# Patient Record
Sex: FEMALE | URBAN - METROPOLITAN AREA
[De-identification: names, ages, dates, MRNs, and addresses within clinical notes are randomized per-mention and may not be internally consistent; named-entity substitution may affect disease eponyms.]

---

## 2022-09-18 ENCOUNTER — CLAIMS CREATED FROM THE CLAIM WINDOW (OUTPATIENT)
Dept: URBAN - METROPOLITAN AREA MEDICAL CENTER 43 | Facility: MEDICAL CENTER | Age: 32
End: 2022-09-18
Payer: COMMERCIAL

## 2022-09-18 DIAGNOSIS — R74.01 ABNORMAL/ELEVATED TRANSAMINASE (SGOT, AMINOTRANSFERASE): ICD-10-CM

## 2022-09-18 DIAGNOSIS — K80.42 CALCULUS OF BILE DUCT W ACUTE CHOLECYSTITIS W/O OBSTRUCTION: ICD-10-CM

## 2022-09-18 DIAGNOSIS — R74.8 ABNORMAL LEVELS OF OTHER SERUM ENZYMES: ICD-10-CM

## 2022-09-18 PROCEDURE — 99254 IP/OBS CNSLTJ NEW/EST MOD 60: CPT | Performed by: INTERNAL MEDICINE

## 2022-09-18 PROCEDURE — 99222 1ST HOSP IP/OBS MODERATE 55: CPT | Performed by: INTERNAL MEDICINE

## 2022-09-19 ENCOUNTER — CLAIMS CREATED FROM THE CLAIM WINDOW (OUTPATIENT)
Dept: URBAN - METROPOLITAN AREA MEDICAL CENTER 43 | Facility: MEDICAL CENTER | Age: 32
End: 2022-09-19
Payer: COMMERCIAL

## 2022-09-19 DIAGNOSIS — K80.50 BILE DUCT CALCULUS: ICD-10-CM

## 2022-09-19 PROCEDURE — 43262 ENDO CHOLANGIOPANCREATOGRAPH: CPT | Performed by: INTERNAL MEDICINE

## 2022-09-19 PROCEDURE — 74328 X-RAY BILE DUCT ENDOSCOPY: CPT | Performed by: INTERNAL MEDICINE

## 2022-09-19 PROCEDURE — 43264 ERCP REMOVE DUCT CALCULI: CPT | Performed by: INTERNAL MEDICINE

## 2022-09-20 ENCOUNTER — CLAIMS CREATED FROM THE CLAIM WINDOW (OUTPATIENT)
Dept: URBAN - METROPOLITAN AREA MEDICAL CENTER 43 | Facility: MEDICAL CENTER | Age: 32
End: 2022-09-20
Payer: COMMERCIAL

## 2022-09-20 DIAGNOSIS — R74.01 ABNORMAL/ELEVATED TRANSAMINASE (SGOT, AMINOTRANSFERASE): ICD-10-CM

## 2022-09-20 DIAGNOSIS — K80.42 CHOLEDOCHOLITHIASIS WITH ACUTE CHOLECYSTITIS: ICD-10-CM

## 2022-09-20 DIAGNOSIS — R74.8 ABNORMAL LEVELS OF OTHER SERUM ENZYMES: ICD-10-CM

## 2022-09-20 PROCEDURE — 99213 OFFICE O/P EST LOW 20 MIN: CPT | Performed by: INTERNAL MEDICINE

## 2022-09-20 PROCEDURE — 99231 SBSQ HOSP IP/OBS SF/LOW 25: CPT | Performed by: INTERNAL MEDICINE

## 2022-10-03 PROBLEM — 89251007 CHOLEDOCHOLITHIASIS WITH ACUTE CHOLECYSTITIS: Status: ACTIVE | Noted: 2022-10-03

## 2022-11-14 ENCOUNTER — OFFICE VISIT (OUTPATIENT)
Dept: URBAN - METROPOLITAN AREA CLINIC 113 | Facility: CLINIC | Age: 32
End: 2022-11-14
Payer: COMMERCIAL

## 2022-11-14 VITALS
DIASTOLIC BLOOD PRESSURE: 79 MMHG | HEART RATE: 63 BPM | HEIGHT: 66 IN | RESPIRATION RATE: 16 BRPM | WEIGHT: 257 LBS | TEMPERATURE: 97.2 F | SYSTOLIC BLOOD PRESSURE: 127 MMHG | BODY MASS INDEX: 41.3 KG/M2

## 2022-11-14 DIAGNOSIS — R74.8 ELEVATED LIVER ENZYMES: ICD-10-CM

## 2022-11-14 DIAGNOSIS — K76.0 FATTY LIVER DISEASE, NONALCOHOLIC: ICD-10-CM

## 2022-11-14 PROBLEM — 197315008: Status: ACTIVE | Noted: 2022-11-14

## 2022-11-14 PROCEDURE — 99213 OFFICE O/P EST LOW 20 MIN: CPT | Performed by: INTERNAL MEDICINE

## 2022-11-14 RX ORDER — ATENOLOL 25 MG/1
1 TABLET TABLET ORAL ONCE A DAY
Status: ACTIVE | COMMUNITY

## 2022-11-14 NOTE — HPI-OTHER HISTORIES
ERCP on 9/19/2022 revealed an 8 mm common bile duct sphincterotomy was performed and a 9 mm balloon sweep revealed some sludge and tiny rajiv of stone.  Liver biopsy pathology on 9/17/2022 revealed bile duct proliferation with mixed inflammatory portal inflammation, less than 1% steatosis and no significant fibrosis consistent with minimal resolving/resolved bile duct process.

## 2022-11-14 NOTE — HPI-TODAY'S VISIT:
Pleasant 32-year-old who presented to the emergency room with cholecystitis.  She had right upper quadrant pain and elevated liver enzymes.  She underwent a laparoscopic cholecystectomy and Intra-Op cholangiogram as well as wedge liver biopsy on 9/17/2022 by Dr. Addi Trevizo.  Intra-Op cholangiogram revealed nonfilling of the duodenum and a meniscus in the distal common bile duct region.  We were consulted on 9/18 for ERCP.  At that time bilirubin was 1.0, AST was 333 and ALT is 517 with an alkaline phosphatase of 202 and lipase of 41.  Ultrasound examination revealed a 5 mm common bile duct, cholelithiasis and mild gallbladder wall thickening. He is doing well and has really no complaints at present.  She rarely will get heartburn symptoms she's had no dysphagia or abdominal pain.  There is no nausea or vomiting.  Her bowels move about every day and has been no blood or melena.

## 2023-09-25 ENCOUNTER — TELEPHONE ENCOUNTER (OUTPATIENT)
Dept: URBAN - METROPOLITAN AREA CLINIC 113 | Facility: CLINIC | Age: 33
End: 2023-09-25

## 2023-12-14 ENCOUNTER — OFFICE VISIT (OUTPATIENT)
Dept: URBAN - METROPOLITAN AREA CLINIC 113 | Facility: CLINIC | Age: 33
End: 2023-12-14

## 2024-01-31 PROBLEM — 127158006: Status: ACTIVE | Noted: 2024-01-31

## 2024-02-01 ENCOUNTER — OV EP (OUTPATIENT)
Dept: URBAN - METROPOLITAN AREA CLINIC 113 | Facility: CLINIC | Age: 34
End: 2024-02-01
Payer: COMMERCIAL

## 2024-02-01 VITALS
HEART RATE: 72 BPM | WEIGHT: 259 LBS | SYSTOLIC BLOOD PRESSURE: 123 MMHG | DIASTOLIC BLOOD PRESSURE: 89 MMHG | RESPIRATION RATE: 18 BRPM | HEIGHT: 66 IN | BODY MASS INDEX: 41.62 KG/M2 | TEMPERATURE: 97.3 F

## 2024-02-01 DIAGNOSIS — R19.7 ACUTE DIARRHEA: ICD-10-CM

## 2024-02-01 DIAGNOSIS — R59.0 PELVIC LYMPHADENOPATHY: ICD-10-CM

## 2024-02-01 DIAGNOSIS — R10.12 LUQ ABDOMINAL PAIN: ICD-10-CM

## 2024-02-01 DIAGNOSIS — K76.0 FATTY LIVER DISEASE, NONALCOHOLIC: ICD-10-CM

## 2024-02-01 PROBLEM — 707724006: Status: ACTIVE | Noted: 2024-02-01

## 2024-02-01 PROBLEM — 236071009: Status: ACTIVE | Noted: 2024-02-01

## 2024-02-01 PROBLEM — 301715003: Status: ACTIVE | Noted: 2024-02-01

## 2024-02-01 PROCEDURE — 99214 OFFICE O/P EST MOD 30 MIN: CPT | Performed by: INTERNAL MEDICINE

## 2024-02-01 RX ORDER — ATENOLOL 25 MG/1
1 TABLET TABLET ORAL ONCE A DAY
Status: ON HOLD | COMMUNITY

## 2024-02-01 NOTE — PHYSICAL EXAM CARDIOVASCULAR:
no edema, no murmurs, regular rate and rhythm Is This A New Presentation, Or A Follow-Up?: Follow Up Acne

## 2024-02-01 NOTE — HPI-OTHER HISTORIES
CT scan of abdomen pelvis with contrast on 9/26/2023 revealed a normal liver, pancreas and spleen.  There is an anteverted uterus with a left anterior exophytic fibroid 4 cm in size.  There is bilateral inguinal and deep pelvic adenopathy with right inguinal lymph node 3.3 cm and right pelvic sidewall 3.6 cm.  ERCP on 9/19/2022 revealed an 8 mm common bile duct sphincterotomy was performed and a 9 mm balloon sweep revealed some sludge and tiny rajiv of stone.  Liver biopsy pathology on 9/17/2022 revealed bile duct proliferation with mixed inflammatory portal inflammation, less than 1% steatosis and no significant fibrosis consistent with minimal resolving/resolved bile duct process.

## 2024-02-01 NOTE — HPI-TODAY'S VISIT:
Pleasant 33-year-old who presented to the emergency room with cholecystitis.  She had right upper quadrant pain and elevated liver enzymes.  She underwent a laparoscopic cholecystectomy and Intra-Op cholangiogram as well as wedge liver biopsy on 9/17/2022 by Dr. Addi Trevizo.  Intra-Op cholangiogram revealed nonfilling of the duodenum and a meniscus in the distal common bile duct region.  We were consulted on 9/18 for ERCP.  At that time bilirubin was 1.0, AST was 333 and ALT is 517 with an alkaline phosphatase of 202 and lipase of 41.  Ultrasound examination revealed a 5 mm common bile duct, cholelithiasis and mild gallbladder wall thickening.  She called the office in September 2023 with left-sided abdominal pain.  Blood work at that time revealed a normal CBC and CMP including normal liver enzymes and a lipase of 30.  She states that she had a sharp pain in the left upper quadrant area that has all resolved now it is uncomfortable when she presses on it but otherwise has no abdominal pain.  She denies any nausea or vomiting.  She will get heartburn maybe twice in 2 weeks.  There is been no dysphagia although sometimes pills are difficult to initiate swallowing.  She does have some sweats at night but not drenching.  There is no fever, she denies any nausea vomiting.  When she had the pain she did have some nausea and vomiting associated with hip but that now has gone.  She has been having diarrhea off and on she has loose stools all the time about 3 to 4/day there is been no blood or melena or formed stool.  There is no nocturnal stool.  There is no bright red blood or melena.  She denies taking any NSAIDs.  She has had radioactive iodine for hyperthyroid nodule.  She is now going to start levothyroxine.  She tells me she may have had some adenopathy in her pelvic region about 2 years ago.  She does see a dermatologist for some skin rash.  Blood work on 1/24/2024 revealed a hemoglobin of 14.4, WBC of 8.8 and platelet count 335,000.  Sodium 140 potassium 4.2 BUN 11 creatinine 0.73.  AST 14, ALT 14, alkaline phosphatase 60, total bili 0.3.  TSH was 20.5 with a free T4 of 0.93.

## 2024-02-02 LAB
C-REACTIVE PROTEIN, QUANT: 1.8
TISSUE TRANSGLUTAMINASE AB, IGA: <1
TISSUE TRANSGLUTAMINASE AB, IGG: <1

## 2024-02-12 LAB
CALPROTECTIN, FECAL: 16
CAMPYLOBACTER GROUP: (no result)
CLOSTRIDIUM DIFFICILE: (no result)
GIARDIA LAMBLIA AG, EIA: (no result)
LACTOFERRIN, FECAL, QUANT.: <6.25
OVA AND PARASITES, CONC AND PERM SMEAR: (no result)
PANCREATIC ELASTASE, FECAL: >500

## 2024-04-05 ENCOUNTER — OV EP (OUTPATIENT)
Dept: URBAN - METROPOLITAN AREA CLINIC 113 | Facility: CLINIC | Age: 34
End: 2024-04-05
Payer: COMMERCIAL

## 2024-04-05 VITALS
BODY MASS INDEX: 41.62 KG/M2 | TEMPERATURE: 97.9 F | SYSTOLIC BLOOD PRESSURE: 107 MMHG | DIASTOLIC BLOOD PRESSURE: 82 MMHG | HEART RATE: 98 BPM | WEIGHT: 259 LBS | RESPIRATION RATE: 16 BRPM | HEIGHT: 66 IN

## 2024-04-05 DIAGNOSIS — K76.0 FATTY LIVER DISEASE, NONALCOHOLIC: ICD-10-CM

## 2024-04-05 DIAGNOSIS — R59.0 PELVIC LYMPHADENOPATHY: ICD-10-CM

## 2024-04-05 DIAGNOSIS — R19.7 ACUTE DIARRHEA: ICD-10-CM

## 2024-04-05 DIAGNOSIS — R10.12 LUQ ABDOMINAL PAIN: ICD-10-CM

## 2024-04-05 PROCEDURE — 99213 OFFICE O/P EST LOW 20 MIN: CPT | Performed by: INTERNAL MEDICINE

## 2024-04-05 RX ORDER — ATENOLOL 25 MG/1
1 TABLET TABLET ORAL ONCE A DAY
Status: ON HOLD | COMMUNITY

## 2024-04-05 RX ORDER — LEVOTHYROXINE SODIUM 75 UG/1
1 TABLET IN THE MORNING ON AN EMPTY STOMACH TABLET ORAL ONCE A DAY
Status: ACTIVE | COMMUNITY

## 2024-04-05 RX ORDER — CHOLESTYRAMINE 4 G/9G
1 PACKET MIXED WITH WATER OR NON-CARBONATED DRINK POWDER, FOR SUSPENSION ORAL TWICE A DAY
Qty: 60 | Refills: 1 | OUTPATIENT
Start: 2024-04-05

## 2024-04-05 NOTE — HPI-TODAY'S VISIT:
Pleasant 33-year-old who presented to the emergency room with cholecystitis.  She had right upper quadrant pain and elevated liver enzymes.  She underwent a laparoscopic cholecystectomy and Intra-Op cholangiogram as well as wedge liver biopsy on 9/17/2022 by Dr. Addi Trevizo.  Intra-Op cholangiogram revealed nonfilling of the duodenum and a meniscus in the distal common bile duct region.  We were consulted on 9/18 for ERCP.  At that time bilirubin was 1.0, AST was 333 and ALT is 517 with an alkaline phosphatase of 202 and lipase of 41.  Ultrasound examination revealed a 5 mm common bile duct, cholelithiasis and mild gallbladder wall thickening.  She called the office in September 2023 with left-sided abdominal pain.  Blood work at that time revealed a normal CBC and CMP including normal liver enzymes and a lipase of 30.  She states that she had a sharp pain in the left upper quadrant area that has all resolved now it is uncomfortable when she presses on it but otherwise has no abdominal pain.  She denies any nausea or vomiting.  She will get heartburn maybe twice in 2 weeks.  There is been no dysphagia although sometimes pills are difficult to initiate swallowing.  She does have some sweats at night but not drenching.  There is no fever, she denies any nausea vomiting.  When she had the pain she did have some nausea and vomiting associated with hip but that now has gone.  She has been having diarrhea off and on she has loose stools all the time about 3 to 4/day there is been no blood or melena or formed stool.  There is no nocturnal stool.  There is no bright red blood or melena.  She denies taking any NSAIDs.  She has had radioactive iodine for hyperthyroid nodule.  She is now going to start levothyroxine.  She tells me she may have had some adenopathy in her pelvic region about 2 years ago.  She does see a dermatologist for some skin rash.  Stool PCR testing for GI pathogens on 3/20/2024 is negative.  Stool studies on 2/1/2024 revealed C. difficile negative, ova and parasites negative, Giardia negative, lactoferrin negative.  Fecal pancreatic elastase greater than 500, fecal calprotectin 16 and normal.  CRP is 1.8, celiac sprue TTG screen negative.  She has been using the fiber but continues to have loose stool and diarrhea.  It occurs twice a day now and sometimes her stool is not just diarrhea but can be loose.  There is been no blood or melena or weight loss.  She has had some nausea Elisse a few days a week.  It can occur at any time.  There is no vomiting, fevers or chills.  She gets some left upper quadrant abdominal pain if she overeats about twice a week.  She gets heartburn but that is less than once a week.  There is no dysphagia.  Blood work on 1/24/2024 revealed a hemoglobin of 14.4, WBC of 8.8 and platelet count 335,000.  Sodium 140 potassium 4.2 BUN 11 creatinine 0.73.  AST 14, ALT 14, alkaline phosphatase 60, total bili 0.3.  TSH was 20.5 with a free T4 of 0.93.

## 2024-05-06 ENCOUNTER — ERX REFILL RESPONSE (OUTPATIENT)
Dept: URBAN - METROPOLITAN AREA CLINIC 113 | Facility: CLINIC | Age: 34
End: 2024-05-06

## 2024-05-06 RX ORDER — CHOLESTYRAMINE 4 G/9G
1 PACKET MIXED WITH WATER OR NON-CARBONATED DRINK POWDER, FOR SUSPENSION ORAL TWICE A DAY
Qty: 60 | Refills: 1 | OUTPATIENT

## 2024-05-06 RX ORDER — CHOLESTYRAMINE POWDER FOR SUSPENSION 4 G/8.78G
MIX 1 PACKET WITH WATER OR NON-CARBONATED DRINK TWICE A DAY POWDER, FOR SUSPENSION ORAL
Qty: 180 PACKET | Refills: 1 | OUTPATIENT

## 2024-06-05 ENCOUNTER — LAB OUTSIDE AN ENCOUNTER (OUTPATIENT)
Dept: URBAN - METROPOLITAN AREA CLINIC 113 | Facility: CLINIC | Age: 34
End: 2024-06-05

## 2024-06-05 ENCOUNTER — DASHBOARD ENCOUNTERS (OUTPATIENT)
Age: 34
End: 2024-06-05

## 2024-06-05 ENCOUNTER — OFFICE VISIT (OUTPATIENT)
Dept: URBAN - METROPOLITAN AREA CLINIC 113 | Facility: CLINIC | Age: 34
End: 2024-06-05
Payer: COMMERCIAL

## 2024-06-05 VITALS — HEIGHT: 66 IN | TEMPERATURE: 96.9 F | WEIGHT: 264 LBS | RESPIRATION RATE: 20 BRPM | BODY MASS INDEX: 42.43 KG/M2

## 2024-06-05 DIAGNOSIS — K52.9 CHRONIC DIARRHEA: ICD-10-CM

## 2024-06-05 DIAGNOSIS — R10.12 LUQ ABDOMINAL PAIN: ICD-10-CM

## 2024-06-05 DIAGNOSIS — R59.0 PELVIC LYMPHADENOPATHY: ICD-10-CM

## 2024-06-05 DIAGNOSIS — R74.8 ELEVATED LIVER ENZYMES: ICD-10-CM

## 2024-06-05 PROCEDURE — 99213 OFFICE O/P EST LOW 20 MIN: CPT | Performed by: INTERNAL MEDICINE

## 2024-06-05 RX ORDER — CHOLESTYRAMINE 4 G/9G
1 PACKET MIXED WITH WATER OR NON-CARBONATED DRINK POWDER, FOR SUSPENSION ORAL TWICE A DAY
Qty: 60 | Refills: 1 | OUTPATIENT

## 2024-06-05 RX ORDER — LEVOTHYROXINE SODIUM 75 UG/1
1 TABLET IN THE MORNING ON AN EMPTY STOMACH TABLET ORAL ONCE A DAY
Status: ACTIVE | COMMUNITY

## 2024-06-05 RX ORDER — LEVOTHYROXINE SODIUM 75 UG/1
1 TABLET IN THE MORNING ON AN EMPTY STOMACH TABLET ORAL ONCE A DAY
Status: ON HOLD | COMMUNITY

## 2024-06-05 RX ORDER — CHOLESTYRAMINE POWDER FOR SUSPENSION 4 G/8.78G
MIX 1 PACKET WITH WATER OR NON-CARBONATED DRINK TWICE A DAY POWDER, FOR SUSPENSION ORAL
Qty: 180 PACKET | Refills: 1 | Status: ACTIVE | COMMUNITY

## 2024-06-05 RX ORDER — ATENOLOL 25 MG/1
1 TABLET TABLET ORAL ONCE A DAY
Status: ON HOLD | COMMUNITY

## 2024-06-05 NOTE — HPI-TODAY'S VISIT:
Pleasant 33-year-old who presented to the emergency room with cholecystitis.  She had right upper quadrant pain and elevated liver enzymes.  She underwent a laparoscopic cholecystectomy and Intra-Op cholangiogram as well as wedge liver biopsy on 9/17/2022 by Dr. Addi Trevizo.  Intra-Op cholangiogram revealed nonfilling of the duodenum and a meniscus in the distal common bile duct region.  We were consulted on 9/18 for ERCP.  At that time bilirubin was 1.0, AST was 333 and ALT is 517 with an alkaline phosphatase of 202 and lipase of 41.  Ultrasound examination revealed a 5 mm common bile duct, cholelithiasis and mild gallbladder wall thickening.  She called the office in September 2023 with left-sided abdominal pain.  Blood work at that time revealed a normal CBC and CMP including normal liver enzymes and a lipase of 30.  She states that she had a sharp pain in the left upper quadrant area that has all resolved now it is uncomfortable when she presses on it but otherwise has no abdominal pain.  She denies any nausea or vomiting.  She will get heartburn maybe twice in 2 weeks.  There is been no dysphagia although sometimes pills are difficult to initiate swallowing.  She does have some sweats at night but not drenching.  There is no fever, she denies any nausea vomiting.  When she had the pain she did have some nausea and vomiting associated with hip but that now has gone.  She has been having diarrhea off and on she has loose stools all the time about 3 to 4/day there is been no blood or melena or formed stool.  There is no nocturnal stool.  There is no bright red blood or melena.  She denies taking any NSAIDs.  She has had radioactive iodine for hyperthyroid nodule.  She is now going to start levothyroxine.  She tells me she may have had some adenopathy in her pelvic region about 2 years ago.  She does see a dermatologist for some skin rash.  Stool PCR testing for GI pathogens on 3/20/2024 is negative.  Stool studies on 2/1/2024 revealed C. difficile negative, ova and parasites negative, Giardia negative, lactoferrin negative.  Fecal pancreatic elastase greater than 500, fecal calprotectin 16 and normal.  CRP is 1.8, celiac sprue TTG screen negative.  Overall she is doing a little better.  She is taking her cholestyramine twice a day and the bowel movements initially are mostly solid but then they become loose.  She has about 3 a day.  There has been no blood or melena.  She gets nausea all the time especially in the morning.  She did vomit yesterday just clear material.  She gets heartburn about twice a week there is been no dysphagia or fever.  She did see Dr. Mckinley/GYN oncologist who plans to do a CT scan on Friday of this week.  She is going to monitor the pelvic adenopathy.  Her dermatologist has found that she is sun sensitive.  She occasionally has some left-sided abdominal discomfort.  She does get hives intermittently and that is been going on for few years.  She has seen an allergist in the past.  Unclear etiology.  Blood work on 1/24/2024 revealed a hemoglobin of 14.4, WBC of 8.8 and platelet count 335,000.  Sodium 140 potassium 4.2 BUN 11 creatinine 0.73.  AST 14, ALT 14, alkaline phosphatase 60, total bili 0.3.  TSH was 20.5 with a free T4 of 0.93.

## 2024-06-25 NOTE — PHYSICAL EXAM HENT:
Head, normocephalic, atraumatic, Face, Face within normal limits, Ears, External ears within normal limits
Abdominal Pain, N/V/D

## 2024-07-08 ENCOUNTER — CLAIMS CREATED FROM THE CLAIM WINDOW (OUTPATIENT)
Dept: URBAN - METROPOLITAN AREA SURGERY CENTER 25 | Facility: SURGERY CENTER | Age: 34
End: 2024-07-08
Payer: COMMERCIAL

## 2024-07-08 ENCOUNTER — CLAIMS CREATED FROM THE CLAIM WINDOW (OUTPATIENT)
Dept: URBAN - METROPOLITAN AREA CLINIC 4 | Facility: CLINIC | Age: 34
End: 2024-07-08
Payer: COMMERCIAL

## 2024-07-08 DIAGNOSIS — K64.4 RESIDUAL HEMORRHOIDAL SKIN TAGS: ICD-10-CM

## 2024-07-08 DIAGNOSIS — R19.7 ACUTE DIARRHEA: ICD-10-CM

## 2024-07-08 DIAGNOSIS — K63.89 OTHER SPECIFIED DISEASES OF INTESTINE: ICD-10-CM

## 2024-07-08 DIAGNOSIS — K63.89 BACTERIAL OVERGROWTH SYNDROME: ICD-10-CM

## 2024-07-08 DIAGNOSIS — R19.7 DIARRHEA: ICD-10-CM

## 2024-07-08 PROCEDURE — 45380 COLONOSCOPY AND BIOPSY: CPT | Performed by: INTERNAL MEDICINE

## 2024-07-08 PROCEDURE — 00811 ANES LWR INTST NDSC NOS: CPT | Performed by: ANESTHESIOLOGY

## 2024-07-08 PROCEDURE — 88305 TISSUE EXAM BY PATHOLOGIST: CPT | Performed by: PATHOLOGY

## 2024-07-08 PROCEDURE — 88313 SPECIAL STAINS GROUP 2: CPT | Performed by: PATHOLOGY

## 2024-07-08 PROCEDURE — 88342 IMHCHEM/IMCYTCHM 1ST ANTB: CPT | Performed by: PATHOLOGY

## 2024-07-08 RX ORDER — ATENOLOL 25 MG/1
1 TABLET TABLET ORAL ONCE A DAY
Status: ON HOLD | COMMUNITY

## 2024-07-08 RX ORDER — CHOLESTYRAMINE POWDER FOR SUSPENSION 4 G/8.78G
MIX 1 PACKET WITH WATER OR NON-CARBONATED DRINK TWICE A DAY POWDER, FOR SUSPENSION ORAL
Qty: 180 PACKET | Refills: 1 | Status: ACTIVE | COMMUNITY

## 2024-07-08 RX ORDER — LEVOTHYROXINE SODIUM 75 UG/1
1 TABLET IN THE MORNING ON AN EMPTY STOMACH TABLET ORAL ONCE A DAY
Status: ACTIVE | COMMUNITY

## 2024-07-08 RX ORDER — LEVOTHYROXINE SODIUM 75 UG/1
1 TABLET IN THE MORNING ON AN EMPTY STOMACH TABLET ORAL ONCE A DAY
Status: ON HOLD | COMMUNITY

## 2024-07-08 RX ORDER — CHOLESTYRAMINE 4 G/9G
1 PACKET MIXED WITH WATER OR NON-CARBONATED DRINK POWDER, FOR SUSPENSION ORAL TWICE A DAY
Qty: 60 | Refills: 1 | Status: ACTIVE | COMMUNITY

## 2024-08-12 ENCOUNTER — OFFICE VISIT (OUTPATIENT)
Dept: URBAN - METROPOLITAN AREA CLINIC 113 | Facility: CLINIC | Age: 34
End: 2024-08-12

## 2024-08-22 ENCOUNTER — OFFICE VISIT (OUTPATIENT)
Dept: URBAN - METROPOLITAN AREA CLINIC 113 | Facility: CLINIC | Age: 34
End: 2024-08-22

## 2024-09-26 ENCOUNTER — OFFICE VISIT (OUTPATIENT)
Dept: URBAN - METROPOLITAN AREA CLINIC 113 | Facility: CLINIC | Age: 34
End: 2024-09-26
Payer: COMMERCIAL

## 2024-09-26 VITALS
SYSTOLIC BLOOD PRESSURE: 118 MMHG | DIASTOLIC BLOOD PRESSURE: 89 MMHG | WEIGHT: 270.4 LBS | HEIGHT: 66 IN | HEART RATE: 74 BPM | TEMPERATURE: 97.7 F | BODY MASS INDEX: 43.46 KG/M2

## 2024-09-26 DIAGNOSIS — K52.9 CHRONIC DIARRHEA: ICD-10-CM

## 2024-09-26 DIAGNOSIS — R10.12 LUQ ABDOMINAL PAIN: ICD-10-CM

## 2024-09-26 DIAGNOSIS — R59.0 PELVIC LYMPHADENOPATHY: ICD-10-CM

## 2024-09-26 DIAGNOSIS — R74.8 ELEVATED LIVER ENZYMES: ICD-10-CM

## 2024-09-26 DIAGNOSIS — K76.0 FATTY LIVER DISEASE, NONALCOHOLIC: ICD-10-CM

## 2024-09-26 PROCEDURE — 99212 OFFICE O/P EST SF 10 MIN: CPT | Performed by: INTERNAL MEDICINE

## 2024-09-26 RX ORDER — CHOLESTYRAMINE 4 G/9G
1 PACKET MIXED WITH WATER OR NON-CARBONATED DRINK POWDER, FOR SUSPENSION ORAL TWICE A DAY
Qty: 60 | Refills: 1 | Status: ON HOLD | COMMUNITY

## 2024-09-26 RX ORDER — ATENOLOL 25 MG/1
1 TABLET TABLET ORAL ONCE A DAY
Status: ON HOLD | COMMUNITY

## 2024-09-26 RX ORDER — CHOLESTYRAMINE 4 G/9G
1 PACKET MIXED WITH WATER OR NON-CARBONATED DRINK POWDER, FOR SUSPENSION ORAL TWICE A DAY
Qty: 60 | Refills: 1 | OUTPATIENT

## 2024-09-26 RX ORDER — LEVOTHYROXINE SODIUM 75 UG/1
1 TABLET IN THE MORNING ON AN EMPTY STOMACH TABLET ORAL ONCE A DAY
Status: ON HOLD | COMMUNITY

## 2024-09-26 RX ORDER — LEVOTHYROXINE SODIUM 75 UG/1
1 TABLET IN THE MORNING ON AN EMPTY STOMACH TABLET ORAL ONCE A DAY
Status: ACTIVE | COMMUNITY

## 2024-09-26 RX ORDER — CHOLESTYRAMINE POWDER FOR SUSPENSION 4 G/8.78G
MIX 1 PACKET WITH WATER OR NON-CARBONATED DRINK TWICE A DAY POWDER, FOR SUSPENSION ORAL
Qty: 180 PACKET | Refills: 1 | Status: ACTIVE | COMMUNITY

## 2024-09-26 NOTE — HPI-OTHER HISTORIES
Colonoscopy on 7/8/2024 revealed a normal terminal ileum, there is mild external hemorrhoids.  The colon mucosa was normal there was no evidence for polyps or colitis.  Random colon mucosal biopsies revealed no significant abnormality specifically no microscopic colitis.  CT scan of abdomen pelvis with contrast on 9/26/2023 revealed a normal liver, pancreas and spleen.  There is an anteverted uterus with a left anterior exophytic fibroid 4 cm in size.  There is bilateral inguinal and deep pelvic adenopathy with right inguinal lymph node 3.3 cm and right pelvic sidewall 3.6 cm.  ERCP on 9/19/2022 revealed an 8 mm common bile duct sphincterotomy was performed and a 9 mm balloon sweep revealed some sludge and tiny rajiv of stone.  Liver biopsy pathology on 9/17/2022 revealed bile duct proliferation with mixed inflammatory portal inflammation, less than 1% steatosis and no significant fibrosis consistent with minimal resolving/resolved bile duct process.

## 2024-09-26 NOTE — HPI-TODAY'S VISIT:
Pleasant 34-year-old who presented to the emergency room with cholecystitis.  She had right upper quadrant pain and elevated liver enzymes.  She underwent a laparoscopic cholecystectomy and Intra-Op cholangiogram as well as wedge liver biopsy on 9/17/2022 by Dr. Addi Trevizo.  Intra-Op cholangiogram revealed nonfilling of the duodenum and a meniscus in the distal common bile duct region.  We were consulted on 9/18 for ERCP.  At that time bilirubin was 1.0, AST was 333 and ALT is 517 with an alkaline phosphatase of 202 and lipase of 41.  Ultrasound examination revealed a 5 mm common bile duct, cholelithiasis and mild gallbladder wall thickening.  She called the office in September 2023 with left-sided abdominal pain.  Blood work at that time revealed a normal CBC and CMP including normal liver enzymes and a lipase of 30.  She states that she had a sharp pain in the left upper quadrant area that has all resolved now it is uncomfortable when she presses on it but otherwise has no abdominal pain.  She denies any nausea or vomiting.  She will get heartburn maybe twice in 2 weeks.  There is been no dysphagia although sometimes pills are difficult to initiate swallowing.  She does have some sweats at night but not drenching.  There is no fever, she denies any nausea vomiting.  When she had the pain she did have some nausea and vomiting associated with hip but that now has gone.  She has been having diarrhea off and on she has loose stools all the time about 3 to 4/day there is been no blood or melena or formed stool.  There is no nocturnal stool.  There is no bright red blood or melena.  She denies taking any NSAIDs.  She has had radioactive iodine for hyperthyroid nodule.  She is now going to start levothyroxine.  She tells me she may have had some adenopathy in her pelvic region about 2 years ago.  She does see a dermatologist for some skin rash.  Stool PCR testing for GI pathogens on 3/20/2024 is negative.  Stool studies on 2/1/2024 revealed C. difficile negative, ova and parasites negative, Giardia negative, lactoferrin negative.  Fecal pancreatic elastase greater than 500, fecal calprotectin 16 and normal.  CRP is 1.8, celiac sprue TTG screen negative.  She is much better.  She has 1 bowel movement a day without blood or melena or diarrhea.  She is no longer using the cholestyramine.  Everything improved after her myomectomy.  She had uterine fibroids.  In addition they biopsied to lymph nodes and they were benign.  She gets heartburn about once a week there is been no abdominal pain or dysphagia.  There is no nausea or vomiting.  There is been no bright red blood or melena.  Again she feels very well.  Blood work on 1/24/2024 revealed a hemoglobin of 14.4, WBC of 8.8 and platelet count 335,000.  Sodium 140 potassium 4.2 BUN 11 creatinine 0.73.  AST 14, ALT 14, alkaline phosphatase 60, total bili 0.3.  TSH was 20.5 with a free T4 of 0.93.